# Patient Record
Sex: MALE | Race: BLACK OR AFRICAN AMERICAN | Employment: UNEMPLOYED | URBAN - METROPOLITAN AREA
[De-identification: names, ages, dates, MRNs, and addresses within clinical notes are randomized per-mention and may not be internally consistent; named-entity substitution may affect disease eponyms.]

---

## 2022-01-11 ENCOUNTER — HOSPITAL ENCOUNTER (EMERGENCY)
Age: 42
Discharge: HOME | End: 2022-01-11
Attending: EMERGENCY MEDICINE
Payer: MEDICAID

## 2022-01-11 VITALS
HEART RATE: 76 BPM | SYSTOLIC BLOOD PRESSURE: 154 MMHG | DIASTOLIC BLOOD PRESSURE: 92 MMHG | RESPIRATION RATE: 16 BRPM | TEMPERATURE: 98.2 F | OXYGEN SATURATION: 99 %

## 2022-01-11 DIAGNOSIS — Z20.2 STD EXPOSURE: Primary | ICD-10-CM

## 2022-01-11 LAB
BILIRUBIN URINE: ABNORMAL
BLOOD, URINE: NEGATIVE
CLARITY: CLEAR
COLOR: ABNORMAL
EPITHELIAL CELLS, UA: 1 /HPF (ref 0–5)
GLUCOSE URINE: NEGATIVE MG/DL
HYALINE CASTS: 7 /LPF (ref 0–8)
KETONES, URINE: ABNORMAL MG/DL
LEUKOCYTE ESTERASE, URINE: NEGATIVE
MICROSCOPIC EXAMINATION: YES
NITRITE, URINE: NEGATIVE
PH UA: 6 (ref 5–8)
PROTEIN UA: ABNORMAL MG/DL
RBC UA: 5 /HPF (ref 0–4)
SPECIFIC GRAVITY UA: >1.03 (ref 1–1.03)
SPECIMEN TYPE: NORMAL
TRICHOMONAS VAGINALIS SCREEN: NEGATIVE
URINE REFLEX TO CULTURE: ABNORMAL
URINE TYPE: ABNORMAL
UROBILINOGEN, URINE: 1 E.U./DL
WBC UA: 6 /HPF (ref 0–5)

## 2022-01-11 PROCEDURE — 81001 URINALYSIS AUTO W/SCOPE: CPT

## 2022-01-11 PROCEDURE — 99281 EMR DPT VST MAYX REQ PHY/QHP: CPT

## 2022-01-11 PROCEDURE — 87808 TRICHOMONAS ASSAY W/OPTIC: CPT

## 2022-01-11 ASSESSMENT — ENCOUNTER SYMPTOMS
DIARRHEA: 0
CONSTIPATION: 0
STRIDOR: 0
APNEA: 0
NAUSEA: 0
ABDOMINAL PAIN: 0
RECTAL PAIN: 0
WHEEZING: 0
EYE DISCHARGE: 0
CHEST TIGHTNESS: 0
EYE REDNESS: 0
PHOTOPHOBIA: 0
BACK PAIN: 0
ANAL BLEEDING: 0
ABDOMINAL DISTENTION: 0
EYE PAIN: 0
VOMITING: 0
EYE ITCHING: 0
SHORTNESS OF BREATH: 0
COUGH: 0
COLOR CHANGE: 0
BLOOD IN STOOL: 0
CHOKING: 0

## 2022-01-11 ASSESSMENT — PAIN SCALES - GENERAL: PAINLEVEL_OUTOF10: 0

## 2022-01-11 NOTE — ED PROVIDER NOTES
[facility]      Pt Name: Nimesh Hurtado  MRN: [id number]  [place] 1980  Date of evaluation: 1/11/2022  Provider: Prasad Ibrahim MD    CHIEF COMPLAINT       Chief Complaint   Patient presents with    Exposure to STD       HISTORY OF PRESENT ILLNESS    Nimesh Hurtado is a 39 y.o. male who presents to the emergency department with STD testing. Patient states he had sex with his baby mama and the condom broke. Concern he now has an STD. Asymptomatic. No other associated symptoms. Nursing Notes were reviewed. Including nursing noted for FM, Surgical History, Past Medical History, Social History, vitals, and allergies; agree with all. REVIEW OF SYSTEMS       Review of Systems   Constitutional: Negative for activity change, appetite change, chills, diaphoresis, fatigue, fever and unexpected weight change. HENT: Negative for congestion, dental problem, drooling, ear discharge and ear pain. Eyes: Negative for photophobia, pain, discharge, redness, itching and visual disturbance. Respiratory: Negative for apnea, cough, choking, chest tightness, shortness of breath, wheezing and stridor. Cardiovascular: Negative for chest pain, palpitations and leg swelling. Gastrointestinal: Negative for abdominal distention, abdominal pain, anal bleeding, blood in stool, constipation, diarrhea, nausea, rectal pain and vomiting. Endocrine: Negative for cold intolerance and heat intolerance. Genitourinary: Negative for decreased urine volume and urgency. Musculoskeletal: Negative for arthralgias and back pain. Skin: Negative for color change and pallor. Neurological: Negative for tremors and facial asymmetry. Hematological: Negative for adenopathy. Does not bruise/bleed easily. Psychiatric/Behavioral: Negative for agitation, behavioral problems, confusion and decreased concentration.        Except as noted above the remainder of the review of systems was reviewed and negative. PAST MEDICAL HISTORY   No past medical history on file. SURGICAL HISTORY     No past surgical history on file. CURRENT MEDICATIONS     There are no discharge medications for this patient. ALLERGIES     Patient has no known allergies. FAMILY HISTORY      No family history on file. SOCIAL HISTORY       Social History     Socioeconomic History    Marital status: Single     Spouse name: Not on file    Number of children: Not on file    Years of education: Not on file    Highest education level: Not on file   Occupational History    Not on file   Tobacco Use    Smoking status: Not on file    Smokeless tobacco: Not on file   Substance and Sexual Activity    Alcohol use: Not on file    Drug use: Not on file    Sexual activity: Not on file   Other Topics Concern    Not on file   Social History Narrative    Not on file     Social Determinants of Health     Financial Resource Strain:     Difficulty of Paying Living Expenses: Not on file   Food Insecurity:     Worried About Running Out of Food in the Last Year: Not on file    Josue of Food in the Last Year: Not on file   Transportation Needs:     Lack of Transportation (Medical): Not on file    Lack of Transportation (Non-Medical):  Not on file   Physical Activity:     Days of Exercise per Week: Not on file    Minutes of Exercise per Session: Not on file   Stress:     Feeling of Stress : Not on file   Social Connections:     Frequency of Communication with Friends and Family: Not on file    Frequency of Social Gatherings with Friends and Family: Not on file    Attends Buddhist Services: Not on file    Active Member of Clubs or Organizations: Not on file    Attends Club or Organization Meetings: Not on file    Marital Status: Not on file   Intimate Partner Violence:     Fear of Current or Ex-Partner: Not on file    Emotionally Abused: Not on file    Physically Abused: Not on file  Sexually Abused: Not on file   Housing Stability:     Unable to Pay for Housing in the Last Year: Not on file    Number of Places Lived in the Last Year: Not on file    Unstable Housing in the Last Year: Not on file       PHYSICAL EXAM       ED Triage Vitals [01/11/22 0314]   BP Temp Temp Source Pulse Resp SpO2 Height Weight   (!) 154/92 98.2 °F (36.8 °C) Oral 76 16 99 % -- --       Physical Exam  Vitals and nursing note reviewed. Constitutional:       General: He is not in acute distress. Appearance: He is well-developed. He is not diaphoretic. HENT:      Head: Normocephalic and atraumatic. Eyes:      General:         Right eye: No discharge. Left eye: No discharge. Pupils: Pupils are equal, round, and reactive to light. Neck:      Thyroid: No thyromegaly. Trachea: No tracheal deviation. Cardiovascular:      Rate and Rhythm: Normal rate and regular rhythm. Heart sounds: No murmur heard. Pulmonary:      Breath sounds: No wheezing or rales. Chest:      Chest wall: No tenderness. Abdominal:      General: There is no distension. Palpations: Abdomen is soft. There is no mass. Tenderness: There is no abdominal tenderness. There is no guarding or rebound. Musculoskeletal:         General: No tenderness or deformity. Normal range of motion. Cervical back: Normal range of motion. Skin:     General: Skin is warm. Coloration: Skin is not pale. Findings: No erythema or rash. Neurological:      Mental Status: He is alert. Cranial Nerves: No cranial nerve deficit. Motor: No abnormal muscle tone.       Coordination: Coordination normal.         DIAGNOSTIC RESULTS     EKG: All EKG's are interpreted by the Emergency Department Physician who either signs or Co-signs this chart in the absence of acardiologist.    None    RADIOLOGY:   Non-plain film images such as CT, Ultrasoundand MRI are read by the radiologist. Plain radiographic images are visualized and preliminarily interpreted by the emergency physician with the below findings:    None    ED BEDSIDE ULTRASOUND:   Performed by ED Physician - none    LABS:  Labs Reviewed   URINE RT REFLEX TO CULTURE - Abnormal; Notable for the following components:       Result Value    Bilirubin Urine SMALL (*)     Ketones, Urine TRACE (*)     Protein, UA TRACE (*)     All other components within normal limits    Narrative:     Performed at:  [address] [address]   Phone [phone]   MICROSCOPIC URINALYSIS - Abnormal; Notable for the following components:    WBC, UA 6 (*)     RBC, UA 5 (*)     All other components within normal limits    Narrative:     Performed at:  [address] [address]   Phone [phone]   C. TRACHOMATIS / N. GONORRHOEAE, DNA   TRICHOMONAS BY EIA    Narrative:     Performed at:  [address] [address]   Phone [phone]       All other labs were withinnormal range or not returned as of this dictation. EMERGENCY DEPARTMENT COURSE and DIFFERENTIAL DIAGNOSIS/MDM:     PMH, Surgical Hx, FH, Social Hx reviewed by myself (ETOH usage, Tobacco usage, Drug usage reviewed by myself, no pertinent Hx)- No Pertinent Hx     Old records were reviewed by me    Gonorrhea and chlamydia treatments not given per patient request.  He wants to wait for his urinalysis to culture out. He knows to get other STD testing done outpatient. I estimate there is LOW risk for Sepsis, MI, Stroke, Tamponade, PTX, Toxicity or other life threatening etiology thus I consider the discharge disposition reasonable. The patient is at low risk for mortality based on demographic, history and clinical factors.  Given the best available information and clinical assessment, I estimate the risk of hospitalization to be greater than risk of treatment at home. I have explained to the patient that the risk could rapidly change, given precautions for return and instructions. Explained to patient that the risk for mortality is low based on demographic, history and clinical factors. I discussed with patient the results of evaluation in the ED, diagnosis, care, and prognosis. The plan is to discharge to home. Patient is in agreement with plan and questions have been answered. I also discussed with patient the reasons which may require a return visit and the importance of follow-up care. The patient is well-appearing, nontoxic, and improved at the time of discharge. Patient agrees to call to arrange follow-up care as directed. Patient understands to return immediately for worsening/change in symptoms. CRITICAL CARE TIME   Total Critical Caretime was 21 minutes, excluding separately reportable procedures. There was a high probability of clinically significant/life threatening deterioration in the patient's condition which required my urgent intervention. PROCEDURES:  Unlessotherwise noted below, none    FINAL IMPRESSION      1. STD exposure          DISPOSITION/PLAN   DISPOSITION Decision To Discharge 01/11/2022 04:43:45 AM    PATIENT REFERRED TO:  PCP            DISCHARGE MEDICATIONS:  There are no discharge medications for this patient.          (Please note that portions ofthis note were completed with a voice recognition program.  Efforts were made to edit the dictations but occasionally words are mis-transcribed.)    Zackery Black MD(electronically signed)  Attending Emergency Physician          Zackery Black MD  01/11/22 1627

## 2022-06-17 NOTE — PROGRESS NOTES
Name_______________________________________Printed:____________________  Date and time of surgery__6/24/2022____0945__________________Arrival Time:____0815  ___masc_________   1. The instructions given regarding when and if a patient needs to stop oral intake prior to surgery varies. Follow the specific instructions you were given                  _x_Nothing to eat or to drink after Midnight the night before.                   ____Carbo loading or ERAS instructions will be given to select patients-if you have been given those instructions -please do the following                           The evening before your surgery after dinner before midnight drink 40 ounces of gatorade. If you are diabetic use sugar free. The morning of surgery drink 40 ounces of water. This needs to be finished 3 hours prior to your surgery start time. 2. Take the following pills with a small sip of water on the morning of surgery___________________________________________________                  Do not take blood pressure medications ending in pril or sartan the leslie prior to surgery or the morning of surgery_   3. Aspirin, Ibuprofen, Advil, Naproxen, Vitamin E and other Anti-inflammatory products and supplements should be stopped for 5 -7days before surgery or as directed by your physician. 4. Check with your Doctor regarding stopping Plavix, Coumadin,Eliquis, Lovenox,Effient,Pradaxa,Xarelto, Fragmin or other blood thinners and follow their instructions. 5. Do not smoke, and do not drink any alcoholic beverages 24 hours prior to surgery. This includes NA Beer. Refrain from the usage of any recreational drugs. 6. You may brush your teeth and gargle the morning of surgery. DO NOT SWALLOW WATER   7. You MUST make arrangements for a responsible adult to stay on site while you are here and take you home after your surgery. You will not be allowed to leave alone or drive yourself home.   It is strongly suggested someone stay with you the first 24 hrs. Your surgery will be cancelled if you do not have a ride home. 8. A parent/legal guardian must accompany a child scheduled for surgery and plan to stay at the hospital until the child is discharged. Please do not bring other children with you. 9. Please wear simple, loose fitting clothing to the hospital.  Fco Quintero not bring valuables (money, credit cards, checkbooks, etc.) Do not wear any makeup (including no eye makeup) or nail polish on your fingers or toes. 10. DO NOT wear any jewelry or piercings on day of surgery. All body piercing jewelry must be removed. 11. If you have ___dentures, they will be removed before going to the OR; we will provide you a container. If you wear ___contact lenses or ___glasses, they will be removed; please bring a case for them. 12. Please see your family doctor/pediatrician for a history & physical and/or concerning medications. Bring any test results/reports from your physician's office. PCP__________________Phone___________H&P Appt. Date________             13 If you  have a Living Will and Durable Power of  for Healthcare, please bring in a copy. 15. Notify your Surgeon if you develop any illness between now and surgery  time, cough, cold, fever, sore throat, nausea, vomiting, etc.  Please notify your surgeon if you experience dizziness, shortness of breath or blurred vision between now & the time of your surgery             15. DO NOT shave your operative site 96 hours prior to surgery. For face & neck surgery, men may use an electric razor 48 hours prior to surgery. 16. Shower the night before or morning of surgery using an antibacterial soap or as you have been instructed. 17. To provide excellent care visitors will be limited to one in the room at any given time. 18.  Please bring picture ID and insurance card.              19.  Visit our web site for additional information:  [email or web address]              20.During flu season no children under the age of 15 are permitted in the hospital for the safety of all patients. 21. If you take a long acting insulin in the evening only  take half of your usual  dose the night  before your procedure              22. If you use a c-pap please bring DOS if staying overnight,             23.For your convenience [facility] has a pharmacy on site to fill your prescriptions. 24. If you use oxygen and have a portable tank please bring it  with you the DOS             25. Bring a complete list of all your medications with name and dose include any supplements. 26. Other__________________________________________   *Please call pre admission testing if you any further questions   Branden Ayala   [address] [place] [address]. [place]  [phone]   [address] [place] [address]       VISITOR POLICY(subject to change)    Current policy is 2 visitors per patient. No children. A mask is required. Visiting hours are 8a-8p. Overnight visitors will be at the discretion of the nurse. All above information reviewed with patient in person or by phone. Patient verbalizes understanding. All questions and concerns addressed.                                                                                                  Patient/Rep__patient__________________                                                                                                                                    PRE OP INSTRUCTIONS

## 2022-06-24 ENCOUNTER — ANESTHESIA EVENT (OUTPATIENT)
Dept: OPERATING ROOM | Age: 42
End: 2022-06-24
Payer: MEDICAID

## 2022-06-24 ENCOUNTER — HOSPITAL ENCOUNTER (OUTPATIENT)
Age: 42
Setting detail: OUTPATIENT SURGERY
Discharge: HOME | End: 2022-06-24
Attending: PODIATRIST | Admitting: PODIATRIST
Payer: MEDICAID

## 2022-06-24 ENCOUNTER — ANESTHESIA (OUTPATIENT)
Dept: OPERATING ROOM | Age: 42
End: 2022-06-24
Payer: MEDICAID

## 2022-06-24 ENCOUNTER — APPOINTMENT (OUTPATIENT)
Dept: GENERAL RADIOLOGY | Age: 42
End: 2022-06-24
Attending: PODIATRIST
Payer: MEDICAID

## 2022-06-24 VITALS
RESPIRATION RATE: 14 BRPM | HEART RATE: 60 BPM | TEMPERATURE: 97.2 F | DIASTOLIC BLOOD PRESSURE: 90 MMHG | SYSTOLIC BLOOD PRESSURE: 137 MMHG | HEIGHT: 72 IN | WEIGHT: 225 LBS | BODY MASS INDEX: 30.48 KG/M2 | OXYGEN SATURATION: 99 %

## 2022-06-24 PROCEDURE — 2709999900 HC NON-CHARGEABLE SUPPLY: Performed by: PODIATRIST

## 2022-06-24 PROCEDURE — 7100000000 HC PACU RECOVERY - FIRST 15 MIN: Performed by: PODIATRIST

## 2022-06-24 PROCEDURE — 2500000003 HC RX 250 WO HCPCS: Performed by: NURSE ANESTHETIST, CERTIFIED REGISTERED

## 2022-06-24 PROCEDURE — 6360000002 HC RX W HCPCS: Performed by: NURSE ANESTHETIST, CERTIFIED REGISTERED

## 2022-06-24 PROCEDURE — 3600000014 HC SURGERY LEVEL 4 ADDTL 15MIN: Performed by: PODIATRIST

## 2022-06-24 PROCEDURE — 7100000010 HC PHASE II RECOVERY - FIRST 15 MIN: Performed by: PODIATRIST

## 2022-06-24 PROCEDURE — 3700000001 HC ADD 15 MINUTES (ANESTHESIA): Performed by: PODIATRIST

## 2022-06-24 PROCEDURE — 7100000001 HC PACU RECOVERY - ADDTL 15 MIN: Performed by: PODIATRIST

## 2022-06-24 PROCEDURE — 7100000011 HC PHASE II RECOVERY - ADDTL 15 MIN: Performed by: PODIATRIST

## 2022-06-24 PROCEDURE — 2720000010 HC SURG SUPPLY STERILE: Performed by: PODIATRIST

## 2022-06-24 PROCEDURE — 2580000003 HC RX 258: Performed by: PODIATRIST

## 2022-06-24 PROCEDURE — 3600000004 HC SURGERY LEVEL 4 BASE: Performed by: PODIATRIST

## 2022-06-24 PROCEDURE — 2580000003 HC RX 258: Performed by: NURSE ANESTHETIST, CERTIFIED REGISTERED

## 2022-06-24 PROCEDURE — 3700000000 HC ANESTHESIA ATTENDED CARE: Performed by: PODIATRIST

## 2022-06-24 PROCEDURE — 2500000003 HC RX 250 WO HCPCS: Performed by: PODIATRIST

## 2022-06-24 PROCEDURE — 73620 X-RAY EXAM OF FOOT: CPT

## 2022-06-24 PROCEDURE — 6360000002 HC RX W HCPCS: Performed by: PODIATRIST

## 2022-06-24 RX ORDER — PROPOFOL 10 MG/ML
INJECTION, EMULSION INTRAVENOUS PRN
Status: DISCONTINUED | OUTPATIENT
Start: 2022-06-24 | End: 2022-06-24 | Stop reason: SDUPTHER

## 2022-06-24 RX ORDER — MIDAZOLAM HYDROCHLORIDE 1 MG/ML
INJECTION INTRAMUSCULAR; INTRAVENOUS PRN
Status: DISCONTINUED | OUTPATIENT
Start: 2022-06-24 | End: 2022-06-24 | Stop reason: SDUPTHER

## 2022-06-24 RX ORDER — LIDOCAINE HYDROCHLORIDE 20 MG/ML
INJECTION, SOLUTION INFILTRATION; PERINEURAL
Status: COMPLETED | OUTPATIENT
Start: 2022-06-24 | End: 2022-06-24

## 2022-06-24 RX ORDER — ONDANSETRON 2 MG/ML
4 INJECTION INTRAMUSCULAR; INTRAVENOUS
Status: DISCONTINUED | OUTPATIENT
Start: 2022-06-24 | End: 2022-06-24 | Stop reason: HOSPADM

## 2022-06-24 RX ORDER — BUPIVACAINE HYDROCHLORIDE 5 MG/ML
INJECTION, SOLUTION EPIDURAL; INTRACAUDAL
Status: COMPLETED | OUTPATIENT
Start: 2022-06-24 | End: 2022-06-24

## 2022-06-24 RX ORDER — DEXMEDETOMIDINE HYDROCHLORIDE 100 UG/ML
INJECTION, SOLUTION INTRAVENOUS PRN
Status: DISCONTINUED | OUTPATIENT
Start: 2022-06-24 | End: 2022-06-24 | Stop reason: SDUPTHER

## 2022-06-24 RX ORDER — ONDANSETRON 2 MG/ML
INJECTION INTRAMUSCULAR; INTRAVENOUS PRN
Status: DISCONTINUED | OUTPATIENT
Start: 2022-06-24 | End: 2022-06-24 | Stop reason: SDUPTHER

## 2022-06-24 RX ORDER — SODIUM CHLORIDE 9 MG/ML
INJECTION, SOLUTION INTRAVENOUS CONTINUOUS PRN
Status: DISCONTINUED | OUTPATIENT
Start: 2022-06-24 | End: 2022-06-24 | Stop reason: SDUPTHER

## 2022-06-24 RX ORDER — KETOROLAC TROMETHAMINE 30 MG/ML
INJECTION, SOLUTION INTRAMUSCULAR; INTRAVENOUS PRN
Status: DISCONTINUED | OUTPATIENT
Start: 2022-06-24 | End: 2022-06-24 | Stop reason: SDUPTHER

## 2022-06-24 RX ORDER — SODIUM CHLORIDE 9 MG/ML
INJECTION, SOLUTION INTRAVENOUS PRN
Status: DISCONTINUED | OUTPATIENT
Start: 2022-06-24 | End: 2022-06-24 | Stop reason: HOSPADM

## 2022-06-24 RX ORDER — MEPERIDINE HYDROCHLORIDE 25 MG/ML
12.5 INJECTION INTRAMUSCULAR; INTRAVENOUS; SUBCUTANEOUS EVERY 5 MIN PRN
Status: DISCONTINUED | OUTPATIENT
Start: 2022-06-24 | End: 2022-06-24 | Stop reason: HOSPADM

## 2022-06-24 RX ORDER — DEXAMETHASONE SODIUM PHOSPHATE 4 MG/ML
INJECTION, SOLUTION INTRA-ARTICULAR; INTRALESIONAL; INTRAMUSCULAR; INTRAVENOUS; SOFT TISSUE PRN
Status: DISCONTINUED | OUTPATIENT
Start: 2022-06-24 | End: 2022-06-24 | Stop reason: SDUPTHER

## 2022-06-24 RX ORDER — HYDROMORPHONE HCL 110MG/55ML
0.25 PATIENT CONTROLLED ANALGESIA SYRINGE INTRAVENOUS EVERY 5 MIN PRN
Status: DISCONTINUED | OUTPATIENT
Start: 2022-06-24 | End: 2022-06-24 | Stop reason: HOSPADM

## 2022-06-24 RX ORDER — ACETAMINOPHEN 650 MG
TABLET, EXTENDED RELEASE ORAL
Status: COMPLETED | OUTPATIENT
Start: 2022-06-24 | End: 2022-06-24

## 2022-06-24 RX ORDER — SODIUM CHLORIDE 0.9 % (FLUSH) 0.9 %
5-40 SYRINGE (ML) INJECTION EVERY 12 HOURS SCHEDULED
Status: DISCONTINUED | OUTPATIENT
Start: 2022-06-24 | End: 2022-06-24 | Stop reason: HOSPADM

## 2022-06-24 RX ORDER — HYDROMORPHONE HCL 110MG/55ML
0.5 PATIENT CONTROLLED ANALGESIA SYRINGE INTRAVENOUS EVERY 5 MIN PRN
Status: DISCONTINUED | OUTPATIENT
Start: 2022-06-24 | End: 2022-06-24 | Stop reason: HOSPADM

## 2022-06-24 RX ORDER — DIPHENHYDRAMINE HYDROCHLORIDE 50 MG/ML
12.5 INJECTION INTRAMUSCULAR; INTRAVENOUS
Status: DISCONTINUED | OUTPATIENT
Start: 2022-06-24 | End: 2022-06-24 | Stop reason: HOSPADM

## 2022-06-24 RX ORDER — FENTANYL CITRATE 50 UG/ML
INJECTION, SOLUTION INTRAMUSCULAR; INTRAVENOUS PRN
Status: DISCONTINUED | OUTPATIENT
Start: 2022-06-24 | End: 2022-06-24 | Stop reason: SDUPTHER

## 2022-06-24 RX ORDER — LIDOCAINE HYDROCHLORIDE 20 MG/ML
INJECTION, SOLUTION EPIDURAL; INFILTRATION; INTRACAUDAL; PERINEURAL PRN
Status: DISCONTINUED | OUTPATIENT
Start: 2022-06-24 | End: 2022-06-24 | Stop reason: SDUPTHER

## 2022-06-24 RX ORDER — SODIUM CHLORIDE 0.9 % (FLUSH) 0.9 %
5-40 SYRINGE (ML) INJECTION PRN
Status: DISCONTINUED | OUTPATIENT
Start: 2022-06-24 | End: 2022-06-24 | Stop reason: HOSPADM

## 2022-06-24 RX ADMIN — MIDAZOLAM 2 MG: 1 INJECTION INTRAMUSCULAR; INTRAVENOUS at 09:44

## 2022-06-24 RX ADMIN — DEXAMETHASONE SODIUM PHOSPHATE 4 MG: 4 INJECTION, SOLUTION INTRAMUSCULAR; INTRAVENOUS at 09:55

## 2022-06-24 RX ADMIN — FENTANYL CITRATE 50 MCG: 50 INJECTION, SOLUTION INTRAMUSCULAR; INTRAVENOUS at 09:47

## 2022-06-24 RX ADMIN — PROPOFOL 250 MG: 10 INJECTION, EMULSION INTRAVENOUS at 09:49

## 2022-06-24 RX ADMIN — SODIUM CHLORIDE: 9 INJECTION, SOLUTION INTRAVENOUS at 09:46

## 2022-06-24 RX ADMIN — SODIUM CHLORIDE: 9 INJECTION, SOLUTION INTRAVENOUS at 10:39

## 2022-06-24 RX ADMIN — LIDOCAINE HYDROCHLORIDE 100 MG: 20 INJECTION, SOLUTION EPIDURAL; INFILTRATION; INTRACAUDAL; PERINEURAL at 09:49

## 2022-06-24 RX ADMIN — DEXMEDETOMIDINE HYDROCHLORIDE 100 MCG: 100 INJECTION, SOLUTION INTRAVENOUS at 11:08

## 2022-06-24 RX ADMIN — CEFAZOLIN 2000 MG: 2 INJECTION, POWDER, FOR SOLUTION INTRAMUSCULAR; INTRAVENOUS at 09:44

## 2022-06-24 RX ADMIN — ONDANSETRON 4 MG: 2 INJECTION INTRAMUSCULAR; INTRAVENOUS at 09:54

## 2022-06-24 RX ADMIN — KETOROLAC TROMETHAMINE 30 MG: 30 INJECTION, SOLUTION INTRAMUSCULAR; INTRAVENOUS at 10:24

## 2022-06-24 RX ADMIN — FENTANYL CITRATE 50 MCG: 50 INJECTION, SOLUTION INTRAMUSCULAR; INTRAVENOUS at 10:45

## 2022-06-24 ASSESSMENT — PAIN DESCRIPTION - PAIN TYPE: TYPE: CHRONIC PAIN

## 2022-06-24 ASSESSMENT — PAIN DESCRIPTION - FREQUENCY: FREQUENCY: CONTINUOUS

## 2022-06-24 ASSESSMENT — PAIN DESCRIPTION - LOCATION: LOCATION: FOOT

## 2022-06-24 ASSESSMENT — PAIN SCALES - GENERAL
PAINLEVEL_OUTOF10: 0
PAINLEVEL_OUTOF10: 2

## 2022-06-24 ASSESSMENT — PAIN DESCRIPTION - ONSET: ONSET: ON-GOING

## 2022-06-24 ASSESSMENT — PAIN DESCRIPTION - DESCRIPTORS: DESCRIPTORS: ACHING

## 2022-06-24 ASSESSMENT — PAIN DESCRIPTION - ORIENTATION: ORIENTATION: LEFT

## 2022-06-24 NOTE — ANESTHESIA POSTPROCEDURE EVALUATION
Department of Anesthesiology  Postprocedure Note    Patient: Yesenia Mcbride  MRN: [id number]  YOB: 1980  Date of evaluation: 6/24/2022      Procedure Summary     Date: 06/24/22 Room / Location: [address]    Anesthesia Start: 3515 Anesthesia Stop: 1112    Procedure: LEFT FOOT HAMMERTOE FOURTH AND FIFTH METATARSAL WITH EXCISION OF BONE SPURRING, LEFT FOOT SKIN PLASTY ADJACENT TISSUE FLAP CLOSURE (Left ) Diagnosis:       Osteophyte, unspecified joint      Laceration of left foot with foreign body, initial encounter      (Osteophyte, unspecified joint [M25.70])      (Laceration of left foot with foreign body, initial encounter [U39.989O])    Surgeons: Evangelina Sutton DPM Responsible Provider: Shira Melendez MD    Anesthesia Type: General ASA Status: 2          Anesthesia Type: General    Yves Phase I: Yves Score: 8    Yves Phase II:        Anesthesia Post Evaluation    Patient location during evaluation: PACU  Patient participation: complete - patient participated  Level of consciousness: awake and alert  Airway patency: patent  Nausea & Vomiting: no nausea and no vomiting  Complications: no  Cardiovascular status: blood pressure returned to baseline  Respiratory status: acceptable  Hydration status: euvolemic  Multimodal analgesia pain management approach

## 2022-06-24 NOTE — OP NOTE
[place] [address]                     [facility], [facility]                                OPERATIVE REPORT    PATIENT NAME: Conor Segovia                     :        1980  MED REC NO:   [id number]                          ROOM:  ACCOUNT NO:   [de-identified]                           ADMIT DATE: 2022  PROVIDER:     Rambo Marx DPM    DATE OF PROCEDURE:  2022    PREOPERATIVE DIAGNOSES:  1.  Grade III ulceration fourth interspace, left foot. 2.  Bone spur fourth and fifth metatarsals, left foot. 3.  Contracture flexor tendon fifth toe, left foot. 4.  Hammertoe fourth and fifth digits, left foot. POSTOPERATIVE DIAGNOSES:  1.  Grade III ulceration fourth interspace, left foot. 2.  Bone spur fourth and fifth metatarsals, left foot. 3.  Contracture flexor tendon fifth toe, left foot. 4.  Hammertoe fourth and fifth digits, left foot. OPERATION PERFORMED:  1. Excision of bone spur fourth and fifth toes and metatarsal regions,  left foot. 2.  Flexor tenotomy, left foot. 3.  Excision grade III ulceration with skin tissue advancement flap with  Schrudde flap with local soft tissue advancement. SURGEON:  Rambo Marx DPM    ANESTHESIA:  General.    COMPLICATIONS:  None noted. ESTIMATED BLOOD LOSS:  Less than 10 mL. PROCEDURE IN DETAIL:  Under mild sedation, the patient was brought into  the operating room and placed on the operating table in the supine  position. Pneumatic ankle cuff was then placed about the patient's left  ankle utilizing excessive cast padding 2 cm above the medial and lateral  malleoli. Following general anesthesia, local anesthesia was obtained  about the midfoot region at the base of the fourth and fifth metatarsals  with 10 mL of 2% lidocaine plain not to cause any vascular compromise it  was done in this particular region.   Foot and lower leg were then  scrubbed, prepped, and draped in the usual aseptic manner. An Esmarch  bandage was then utilized to exsanguinate the patient's left foot and  lower leg and the pneumatic ankle cuff was inflated to 250 mmHg, left. Attention was then directed to the dorsal aspect of the fourth toe,  where a 4 cm incision was made directly over the dorsal lateral aspect  of the proximal phalanx and the fourth metatarsal head region. Dissection was then continued all the way down through the subcutaneous  tissue with care being taken to identify and retract all vital neural  and vascular structures. All bleeders were cauterized and ligated as  necessary. Next, dissection was then continued down to the periosteal  and capsular structures where a 4 cm incision was made to the periosteal  and capsular structures thus exposing the large bone spur on the lateral  aspect of the base of the proximal phalanx and the head of the fourth  metatarsal.  Utilizing a 138 saw blade at this time, the saw was  directed from superior to inferior along the lateral aspect beginning on  the proximal phalanx and continuing down to the fourth metatarsal head  region. This wedge of bone 1.5 cm was taken from the lateral aspect of  the proximal phalanx and fourth metatarsal head. This bone was removed  in total.  This was all smoothed down with a power reciprocating rasp. The area was then flushed with copious amounts of sterile normal saline. Intraoperative C-arm was then brought into play to make sure there was  excellent resection and excision. There was perfect line and perfect  excision of this bone spur. Periosteal and capsular structures were  then sewn with 3-0 Vicryl. Subcutaneous tissues sewn with 4-0 Vicryl,  skin was then sewn with 3-0 nylon. Next, attention was then directed to  the distal aspect of the IP joint in the fifth toe where we made a stab  incision on the inferior aspect.   We transected the flexor tendon by  dorsiflexing the distal portion of the fifth toe and transecting it from  medial to lateral.  This was complete release and this brought the toe  out of the adducted varus position. This was flushed with copious  amounts of sterile normal saline. One 3-0 nylon stitch with simple  interrupted suture technique was then placed below this. Next, we made  an incision 3 cm over the dorsal central aspect of the fifth proximal  phalanx. The incision was made over this particular region. Once we  made the incision over this particular region, we opened the tissue  planes up from medial to lateral and there was a very large bone spur  present over the medial aspect of the middle, distal and proximal  phalanx. We took a 138 saw blade and we resected 1.5 cm of bone from  this particular region. This bone was removed in total from the distal,  medial and proximal phalanx in total.  We took a power reciprocating  rasp, contoured the edges down. Once the edges were then contoured  down, we took an intraoperative C-arm view to confirm there was  excellent resection of bone spur from this particular region. Please  note that in the subcutaneous tissues, there was a tremendous amount of  yellow fibrotic dead tissue and we reduced this and removed this all the  way down to healthy subperiosteal red granular tissue. The area was  then flushed with copious amounts of sterile normal saline. We took 3-0  Vicryl and we sewed the periosteal and capsular structures from medial  to lateral with simple interrupted suture technique, subcuticular tissue  sewn with 4-0 Vicryl with simple interrupted suture technique and the  skin was then sewn with 3-0 nylon utilizing simple interrupted suture  technique. Final part of the procedure was directed to the interspace  where there was a 3.5 x 2.4 grade III ulceration that was present with  dead yellow skin accompanying this.   We took a 15 blade and we  circumscribed all around this particular region which was on the medial  aspect of the fifth toe and the lateral aspect of the fourth toe. This  wedge of skin was completely resected and passed from the operative  field in total.  There was extensive undermining done over the superior  and inferior portions. There was a second incision made directly over  the dorsal aspect of the interspace that was Schrudde flap. This flap  was then done in conjunction with this and we were able to rotate the  flap down to the interspace and then we were able to stretch the flap  from medial to lateral to cover all of the open areas that were present  here. The subcutaneous tissues were then sewn with 3-0 Vicryl at this  time. Skin was then sewn with 3-0 nylon and excellent skin apposition  side to side with no tension or compression was noted and there was full  coverage of this particular region with brand new skin. The area was  then flushed with copious amounts of sterile normal saline. Final  intraoperative C-arm view confirmed excellent resection of bone spurs  from the lateral aspect of the fourth toe and fourth metatarsal and the  medial aspect of the fifth toe. Excellent position of the toes with gap  separation, no further rubbing so this should hopefully eliminate the  ulceration problem that the patient has been having for multiple years. At this time, we utilized a total of 5 mL of 0.5% Marcaine plain. This  was dressed with Betadine-soaked Adaptic, sterile 4 x 4's, 4 x 8's, two  rolls of 2-inch Diallo, several layers of fluffy cast padding, and Coban  were then applied. Tourniquet had been deflated and a prompt hyperemic  response was noted in all digits one through five of the left foot. The patient tolerated the procedure and anesthesia well and transferred  to the recovery room with vital signs stable and vascular status intact  to all digits one through five of the left foot.   Following a period of  postop monitoring, the patient will be discharged home on oral and  written postop instructions per

## 2022-06-24 NOTE — PROGRESS NOTES
Dr Eugene Mayre    Pre op: Hammertoe 4th and 5th digits left foot, Bone spur 4th and 5th digits left foot, Ulcer grade 3 left foot, Pain, Tendon contracture left foot    Post op: same    Procedure: Excision bone spur 4th and 5th metatarsals left foot, Flexor tenotomy left foot, Adjacent tissue flap with soft tissue rearrangement left foot withy exciswion ulceration left foot    Anesthesia: General    Complications: None noted    Ebl: less than 10 cc

## 2022-06-24 NOTE — ANESTHESIA PRE PROCEDURE
Department of Anesthesiology  Preprocedure Note       Name:  Cornell Stephens   Age:  39 y.o.  :  1980                                          MRN:  [id number]         Date:  2022      Surgeon: Taniya Bailon):  Kristi Carpio DPM    Procedure: Procedure(s):  LEFT FOOT HAMMERTOE FOURTH AND FIFTH METATARSAL WITH EXCISION OF BONE SPURRING, LEFT FOOT SKIN PLASTY ADJACENT TISSUE FLAP CLOSURE    Medications prior to admission:   Prior to Admission medications    Not on File       Current medications:    No current facility-administered medications for this encounter. No current outpatient medications on file. Allergies:  No Known Allergies    Problem List:  There is no problem list on file for this patient. Past Medical History:        Diagnosis Date    Hypertension        Past Surgical History:        Procedure Laterality Date    CHOLECYSTECTOMY         Social History:    Social History     Tobacco Use    Smoking status: Current Every Day Smoker     Years: 20.00     Types: Cigars    Smokeless tobacco: Never Used   Substance Use Topics    Alcohol use: Never                                Ready to quit: Not Answered  Counseling given: Not Answered      Vital Signs (Current):   Vitals:    22 1445   Weight: 249 lb (112.9 kg)   Height: 6' (1.829 m)                                              BP Readings from Last 3 Encounters:   22 (!) 154/92       NPO Status:                                                                                 BMI:   Wt Readings from Last 3 Encounters:   22 249 lb (112.9 kg)     Body mass index is 33.77 kg/m². CBC: No results found for: WBC, RBC, HGB, HCT, MCV, RDW, PLT    CMP: No results found for: NA, K, CL, CO2, BUN, CREATININE, GFRAA, AGRATIO, LABGLOM, GLUCOSE, GLU, PROT, CALCIUM, BILITOT, ALKPHOS, AST, ALT    POC Tests: No results for input(s): POCGLU, POCNA, POCK, POCCL, POCBUN, POCHEMO, POCHCT in the last 72 hours.     Coags: No results found for: PROTIME, INR, APTT    HCG (If Applicable): No results found for: PREGTESTUR, PREGSERUM, HCG, HCGQUANT     ABGs: No results found for: PHART, PO2ART, XKB5FPY, LZW3EPS, BEART, S9BVOVGG     Type & Screen (If Applicable):  No results found for: LABABO, LABRH    Drug/Infectious Status (If Applicable):  No results found for: HIV, HEPCAB    COVID-19 Screening (If Applicable): No results found for: COVID19        Anesthesia Evaluation  Patient summary reviewed and Nursing notes reviewed  Airway: Mallampati: II  TM distance: >3 FB   Neck ROM: full  Mouth opening: > = 3 FB   Dental:          Pulmonary:                              Cardiovascular:  Exercise tolerance: good (>4 METS),   (+) hypertension: moderate,                   Neuro/Psych:               GI/Hepatic/Renal:             Endo/Other:                     Abdominal:             Vascular: Other Findings:           Anesthesia Plan      general     ASA 2       Induction: intravenous. MIPS: Postoperative opioids intended, Prophylactic antiemetics administered and Postoperative trial extubation. Anesthetic plan and risks discussed with patient. Plan discussed with CRNA.           Post-op pain plan if not by surgeon: single peripheral nerve block and regional            Roxy Schrader MD   6/24/2022

## 2022-06-24 NOTE — PROGRESS NOTES
Discharged home via W/C to care of friend. Ventura Santiago- non weight bearing left foot. Dressing remains dry.  Denies pain/ nausea

## 2022-06-24 NOTE — PROGRESS NOTES
Discharge instructions review with patient and Luisa Monreal. All home medications have been reviewed, pt v/u. Discharge instructions signed. Pt discharged via wheelchair. Pt discharged with crutches and all belongings. Marcial Felipe taking stable pt home.

## 2022-06-24 NOTE — H&P
Pt her today for [facility] surgery with Dr Sindhu Camejo. He was seen by PCP on 6/20/22. This document was reviewed. No updates . Heart tones normal.  Lungs are clear.   Pt encouraged to stop smoking

## 2022-06-24 NOTE — PROGRESS NOTES
Pt arrived from OR to PACU bay. Reported received from [address] staff. Pt combative and trying to climb out of bed. Surgical incisions dressings in place to L foot. Pt on 6L simple mask with Nasal trumpet in place, NSR, and VSS. Will continue to monitor.

## 2023-01-25 NOTE — PROGRESS NOTES
Name_______________________________________Printed:____________________  Date and time of surgery__2/1/2023___0730___________________Arrival Time:__0600  masc______________   1. The instructions given regarding when and if a patient needs to stop oral intake prior to surgery varies. Follow the specific instructions you were given                  _x__Nothing to eat or to drink after Midnight the night before.                   ____Carbo loading or ERAS instructions will be given to select patients-if you have been given those instructions -please do the following                           The evening before your surgery after dinner before midnight drink 40 ounces of gatorade. If you are diabetic use sugar free. The morning of surgery drink 40 ounces of water. This needs to be finished 3 hours prior to your surgery start time. 2. Take the following pills with a small sip of water on the morning of surgery_none__________________________________________________                  Do not take blood pressure medications ending in pril or sartan the leslie prior to surgery or the morning of surgery. Dr Cristel Headley patient are not to take any medications the AM of surgery. 3. Aspirin, Ibuprofen, Advil, Naproxen, Vitamin E and other Anti-inflammatory products and supplements should be stopped for 5 -7days before surgery or as directed by your physician. 4. Check with your Doctor regarding stopping Plavix, Coumadin,Eliquis, Lovenox,Effient,Pradaxa,Xarelto, Fragmin or other blood thinners and follow their instructions. 5. Do not smoke, and do not drink any alcoholic beverages 24 hours prior to surgery. This includes NA Beer. Refrain from the usage of any recreational drugs. 6. You may brush your teeth and gargle the morning of surgery. DO NOT SWALLOW WATER   7. You MUST make arrangements for a responsible adult to stay on site while you are here and take you home after your surgery.  You will not be allowed to leave alone or drive yourself home. It is strongly suggested someone stay with you the first 24 hrs. Your surgery will be cancelled if you do not have a ride home. 8. A parent/legal guardian must accompany a child scheduled for surgery and plan to stay at the hospital until the child is discharged. Please do not bring other children with you. 9. Please wear simple, loose fitting clothing to the hospital.  [place] not bring valuables (money, credit cards, checkbooks, etc.) Do not wear any makeup (including no eye makeup) or nail polish on your fingers or toes. 10. DO NOT wear any jewelry or piercings on day of surgery. All body piercing jewelry must be removed. 11. If you have ___dentures, they will be removed before going to the OR; we will provide you a container. If you wear ___contact lenses or ___glasses, they will be removed; please bring a case for them. 12. Please see your family doctor/pediatrician for a history & physical and/or concerning medications. Bring any test results/reports from your physician's office. PCP__________________Phone___________H&P Appt. Date________             13 If you  have a Living Will and Durable Power of  for Healthcare, please bring in a copy. 15. Notify your Surgeon if you develop any illness between now and surgery  time, cough, cold, fever, sore throat, nausea, vomiting, etc.  Please notify your surgeon if you experience dizziness, shortness of breath or blurred vision between now & the time of your surgery             15. DO NOT shave your operative site 96 hours prior to surgery. For face & neck surgery, men may use an electric razor 48 hours prior to surgery. 16. Shower the night before or morning of surgery using an antibacterial soap or as you have been instructed. 17. To provide excellent care visitors will be limited to one in the room at any given time.              18.  Please bring picture ID and insurance card. 19.  Visit our web site for additional information:  [email or web address]              20.During flu season no children under the age of 15 are permitted in the hospital for the safety of all patients. 21. If you take a long acting insulin in the evening only  take half of your usual  dose the night  before your procedure              22. If you use a c-pap please bring DOS if staying overnight,             23.For your convenience [facility] has a pharmacy on site to fill your prescriptions. 24. If you use oxygen and have a portable tank please bring it  with you the DOS             25. Bring a complete list of all your medications with name and dose include any supplements. 26. Other__________________________________________   *Please call pre admission testing if you any further questions   [place] [id number]. Air  [phone]   [address] [place] [address]       VISITOR POLICY(subject to change)    Current policy is 2 visitors per patient. No children. Mask is  at the discretion of the facility. Visiting hours are 8a-8p. Overnight visitors will be at the discretion of the nurse. All policies subject to change. All above information reviewed with patient in person or by phone. Patient verbalizes understanding. All questions and concerns addressed.                                                                                                  Patient/Rep_patient___________________                                                                                                                                    PRE OP INSTRUCTIONS

## 2023-01-31 NOTE — H&P
[facility]   HISTORY AND PHYSICAL      I am seeing Naheed Cowan at the request of Dr Annabella Collazo for evaluation of the patient's medical problems prior to right foot excision ulceration with local advancement tissue flap for closure - right     1/31/2023 3:40 PM    Patient Information:  Naheed Cowan is a 43 y.o. male [id number]  PCP:  No primary care provider on file. (Tel: None )    Chief complaint:  I am having surgery on my foot       History of Present Illness:  Chaz Carreon is a 43 y.o. male who presented with an ulceration between his 4th and 5 th toe. Symptom onset was gradual for a time period of several months. The severity is described as moderate. The course of his symptoms over time is recurrent. The symptoms improved with non weight bearing and worsened with weight bearing. The patient's symptom is associated with an ulceration. History obtained from patient and record review   Pt is physically active     REVIEW OF SYSTEMS:   Constitutional:  Negative for fever,chills or night sweats  ENT:  Negative for rhinorrhea, epistaxis, hoarseness, sore throat. Respiratory:   Negative for shortness of breath,wheezing  Cardiovascular:   Negative for  chest pain, palpitations   Gastrointestinal:  Negative for nausea, vomiting, diarrhea  Genitourinary:  Negative for polyuria, dysuria   Hematologic/Lymphatic:  Negative for  bleeding tendency, easy bruising  Musculoskeletal:  Negative for myalgias and arthralgias  Neurologic:  Negative for  confusion,dysarthria. Skin:  Negative for itching,rash  Psychiatric:  Negative for depression,anxiety, agitation. Endocrine:  Negative for polydipsia,polyuria,heat /cold intolerance. Past Medical History:   has a past medical history of Hypertension and Prolonged emergence from general anesthesia.      Past Surgical History:   has a past surgical history that includes Cholecystectomy and Foot surgery (Left, 2022). Medications:  No current facility-administered medications on file prior to encounter. Current Outpatient Medications on File Prior to Encounter   Medication Sig Dispense Refill    amLODIPine (NORVASC) 5 MG tablet Take 5 mg by mouth daily      hydroCHLOROthiazide (MICROZIDE) 12.5 MG capsule Take 12.5 mg by mouth daily         Allergies:   NKA    Social History:   reports that he has been smoking cigars. He has never used smokeless tobacco. He reports current drug use. Drug: Marijuana Grover Hotter). He reports that he does not drink alcohol. Family History:  Patient raised by his grandmother who he thinks  from stroke  Hx of parents and sibs is unknown     Physical Exam:  BP (!) 143/94   Pulse 67   Temp 97.8 °F (36.6 °C) (Temporal)   Resp 20   Ht 6' (1.829 m)   Wt 220 lb (99.8 kg)   SpO2 100%   BMI 29.84 kg/m²     General appearance:  Appears comfortable. Well nourished  Eyes: Sclera clear, pupils equal  ENT: Moist mucus membranes, no thrush. Trachea midline. Cardiovascular: Regular rhythm, normal S1, S2. No murmur, gallop, rub. No edema in lower extremities  Respiratory: Clear to auscultation bilaterally, no wheeze, good inspiratory effort  Gastrointestinal: Abdomen soft, non-tender, not distended, normal bowel sounds  Musculoskeletal: No cyanosis in digits, neck supple  Neurology: Cranial nerves grossly intact. Alert and oriented in time, place and person. No speech or motor deficits  Psychiatry: Appropriate affect.  Not agitated  Skin: Warm, dry, normal turgor, no rash    Labs:  CBC: No results found for: WBC, RBC, HGB, HCT, MCV, MCH, MCHC, RDW, PLT, MPV  BMP:  No results found for: NA, K, CL, CO2, BUN, CREATININE, CALCIUM, GFRAA, LABGLOM, GLUCOSE    Chest Xray:   EKG:        Problem List  Foot ulceration  HTN  Tobacco use        Assessment & Recommendation:     Foot ulceration: for surgery today   HTN: On CCB and HCTZ  Tobacco use:  cessation encouraged Patient denies any prolonged emergence from anesthesia: He denies any previous issues with anesthesia      Patient is  medically optimized for surgery. Thank you for the opportunity to participate in the care of your patient.   Irl Ganser, APRN - CNP    2/1/2023 7:12 AM

## 2023-02-01 ENCOUNTER — APPOINTMENT (OUTPATIENT)
Dept: GENERAL RADIOLOGY | Age: 43
End: 2023-02-01
Attending: PODIATRIST
Payer: MEDICAID

## 2023-02-01 ENCOUNTER — ANESTHESIA (OUTPATIENT)
Dept: OPERATING ROOM | Age: 43
End: 2023-02-01
Payer: MEDICAID

## 2023-02-01 ENCOUNTER — ANESTHESIA EVENT (OUTPATIENT)
Dept: OPERATING ROOM | Age: 43
End: 2023-02-01
Payer: MEDICAID

## 2023-02-01 ENCOUNTER — HOSPITAL ENCOUNTER (OUTPATIENT)
Age: 43
Setting detail: OUTPATIENT SURGERY
Discharge: HOME | End: 2023-02-01
Attending: PODIATRIST | Admitting: PODIATRIST
Payer: MEDICAID

## 2023-02-01 VITALS
WEIGHT: 220 LBS | TEMPERATURE: 97.4 F | HEIGHT: 72 IN | DIASTOLIC BLOOD PRESSURE: 92 MMHG | OXYGEN SATURATION: 99 % | BODY MASS INDEX: 29.8 KG/M2 | SYSTOLIC BLOOD PRESSURE: 139 MMHG | RESPIRATION RATE: 16 BRPM | HEART RATE: 62 BPM

## 2023-02-01 DIAGNOSIS — M67.01 SHORT ACHILLES TENDON (ACQUIRED), RIGHT ANKLE: ICD-10-CM

## 2023-02-01 DIAGNOSIS — S91.321A LACERATION WITH FOREIGN BODY, RIGHT FOOT, INITIAL ENCOUNTER: ICD-10-CM

## 2023-02-01 DIAGNOSIS — M25.70 OSTEOPHYTE, UNSPECIFIED JOINT: ICD-10-CM

## 2023-02-01 PROCEDURE — 6360000002 HC RX W HCPCS: Performed by: PODIATRIST

## 2023-02-01 PROCEDURE — 2500000003 HC RX 250 WO HCPCS: Performed by: NURSE ANESTHETIST, CERTIFIED REGISTERED

## 2023-02-01 PROCEDURE — 73620 X-RAY EXAM OF FOOT: CPT

## 2023-02-01 PROCEDURE — 6360000002 HC RX W HCPCS: Performed by: NURSE ANESTHETIST, CERTIFIED REGISTERED

## 2023-02-01 PROCEDURE — 88305 TISSUE EXAM BY PATHOLOGIST: CPT

## 2023-02-01 PROCEDURE — 3700000000 HC ANESTHESIA ATTENDED CARE: Performed by: PODIATRIST

## 2023-02-01 PROCEDURE — 7100000001 HC PACU RECOVERY - ADDTL 15 MIN: Performed by: PODIATRIST

## 2023-02-01 PROCEDURE — 2709999900 HC NON-CHARGEABLE SUPPLY: Performed by: PODIATRIST

## 2023-02-01 PROCEDURE — 2500000003 HC RX 250 WO HCPCS: Performed by: PODIATRIST

## 2023-02-01 PROCEDURE — 2580000003 HC RX 258: Performed by: NURSE ANESTHETIST, CERTIFIED REGISTERED

## 2023-02-01 PROCEDURE — 2580000003 HC RX 258: Performed by: PODIATRIST

## 2023-02-01 PROCEDURE — 7100000011 HC PHASE II RECOVERY - ADDTL 15 MIN: Performed by: PODIATRIST

## 2023-02-01 PROCEDURE — 3600000014 HC SURGERY LEVEL 4 ADDTL 15MIN: Performed by: PODIATRIST

## 2023-02-01 PROCEDURE — 3700000001 HC ADD 15 MINUTES (ANESTHESIA): Performed by: PODIATRIST

## 2023-02-01 PROCEDURE — 2720000010 HC SURG SUPPLY STERILE: Performed by: PODIATRIST

## 2023-02-01 PROCEDURE — 3600000004 HC SURGERY LEVEL 4 BASE: Performed by: PODIATRIST

## 2023-02-01 PROCEDURE — 7100000010 HC PHASE II RECOVERY - FIRST 15 MIN: Performed by: PODIATRIST

## 2023-02-01 PROCEDURE — 7100000000 HC PACU RECOVERY - FIRST 15 MIN: Performed by: PODIATRIST

## 2023-02-01 RX ORDER — SODIUM CHLORIDE, SODIUM LACTATE, POTASSIUM CHLORIDE, CALCIUM CHLORIDE 600; 310; 30; 20 MG/100ML; MG/100ML; MG/100ML; MG/100ML
INJECTION, SOLUTION INTRAVENOUS CONTINUOUS PRN
Status: DISCONTINUED | OUTPATIENT
Start: 2023-02-01 | End: 2023-02-01 | Stop reason: SDUPTHER

## 2023-02-01 RX ORDER — HYDROMORPHONE HCL 110MG/55ML
0.25 PATIENT CONTROLLED ANALGESIA SYRINGE INTRAVENOUS EVERY 5 MIN PRN
Status: DISCONTINUED | OUTPATIENT
Start: 2023-02-01 | End: 2023-02-01 | Stop reason: HOSPADM

## 2023-02-01 RX ORDER — SODIUM CHLORIDE 0.9 % (FLUSH) 0.9 %
5-40 SYRINGE (ML) INJECTION EVERY 12 HOURS SCHEDULED
Status: DISCONTINUED | OUTPATIENT
Start: 2023-02-01 | End: 2023-02-01 | Stop reason: HOSPADM

## 2023-02-01 RX ORDER — BUPIVACAINE HYDROCHLORIDE 5 MG/ML
INJECTION, SOLUTION EPIDURAL; INTRACAUDAL
Status: COMPLETED | OUTPATIENT
Start: 2023-02-01 | End: 2023-02-01

## 2023-02-01 RX ORDER — HYDROCHLOROTHIAZIDE 12.5 MG/1
12.5 CAPSULE, GELATIN COATED ORAL DAILY
COMMUNITY

## 2023-02-01 RX ORDER — DEXAMETHASONE SODIUM PHOSPHATE 4 MG/ML
INJECTION, SOLUTION INTRA-ARTICULAR; INTRALESIONAL; INTRAMUSCULAR; INTRAVENOUS; SOFT TISSUE PRN
Status: DISCONTINUED | OUTPATIENT
Start: 2023-02-01 | End: 2023-02-01 | Stop reason: SDUPTHER

## 2023-02-01 RX ORDER — GLYCOPYRROLATE 0.2 MG/ML
INJECTION INTRAMUSCULAR; INTRAVENOUS PRN
Status: DISCONTINUED | OUTPATIENT
Start: 2023-02-01 | End: 2023-02-01 | Stop reason: SDUPTHER

## 2023-02-01 RX ORDER — SODIUM CHLORIDE, SODIUM LACTATE, POTASSIUM CHLORIDE, CALCIUM CHLORIDE 600; 310; 30; 20 MG/100ML; MG/100ML; MG/100ML; MG/100ML
INJECTION, SOLUTION INTRAVENOUS CONTINUOUS
Status: DISCONTINUED | OUTPATIENT
Start: 2023-02-01 | End: 2023-02-01 | Stop reason: HOSPADM

## 2023-02-01 RX ORDER — SODIUM CHLORIDE 9 MG/ML
INJECTION, SOLUTION INTRAVENOUS PRN
Status: DISCONTINUED | OUTPATIENT
Start: 2023-02-01 | End: 2023-02-01 | Stop reason: HOSPADM

## 2023-02-01 RX ORDER — ACETAMINOPHEN 650 MG
TABLET, EXTENDED RELEASE ORAL
Status: COMPLETED | OUTPATIENT
Start: 2023-02-01 | End: 2023-02-01

## 2023-02-01 RX ORDER — ONDANSETRON 2 MG/ML
INJECTION INTRAMUSCULAR; INTRAVENOUS PRN
Status: DISCONTINUED | OUTPATIENT
Start: 2023-02-01 | End: 2023-02-01 | Stop reason: SDUPTHER

## 2023-02-01 RX ORDER — AMLODIPINE BESYLATE 5 MG/1
5 TABLET ORAL DAILY
COMMUNITY
Start: 2021-02-01

## 2023-02-01 RX ORDER — PROPOFOL 10 MG/ML
INJECTION, EMULSION INTRAVENOUS PRN
Status: DISCONTINUED | OUTPATIENT
Start: 2023-02-01 | End: 2023-02-01 | Stop reason: SDUPTHER

## 2023-02-01 RX ORDER — ONDANSETRON 2 MG/ML
4 INJECTION INTRAMUSCULAR; INTRAVENOUS
Status: DISCONTINUED | OUTPATIENT
Start: 2023-02-01 | End: 2023-02-01 | Stop reason: HOSPADM

## 2023-02-01 RX ORDER — LIDOCAINE HYDROCHLORIDE 20 MG/ML
INJECTION, SOLUTION INFILTRATION; PERINEURAL
Status: COMPLETED | OUTPATIENT
Start: 2023-02-01 | End: 2023-02-01

## 2023-02-01 RX ORDER — FENTANYL CITRATE 50 UG/ML
INJECTION, SOLUTION INTRAMUSCULAR; INTRAVENOUS PRN
Status: DISCONTINUED | OUTPATIENT
Start: 2023-02-01 | End: 2023-02-01 | Stop reason: SDUPTHER

## 2023-02-01 RX ORDER — SODIUM CHLORIDE 0.9 % (FLUSH) 0.9 %
5-40 SYRINGE (ML) INJECTION PRN
Status: DISCONTINUED | OUTPATIENT
Start: 2023-02-01 | End: 2023-02-01 | Stop reason: HOSPADM

## 2023-02-01 RX ORDER — LIDOCAINE HYDROCHLORIDE 20 MG/ML
INJECTION, SOLUTION INFILTRATION; PERINEURAL PRN
Status: DISCONTINUED | OUTPATIENT
Start: 2023-02-01 | End: 2023-02-01 | Stop reason: SDUPTHER

## 2023-02-01 RX ORDER — KETAMINE HCL IN NACL, ISO-OSM 100MG/10ML
SYRINGE (ML) INJECTION PRN
Status: DISCONTINUED | OUTPATIENT
Start: 2023-02-01 | End: 2023-02-01 | Stop reason: SDUPTHER

## 2023-02-01 RX ORDER — HYDROMORPHONE HCL 110MG/55ML
0.5 PATIENT CONTROLLED ANALGESIA SYRINGE INTRAVENOUS EVERY 5 MIN PRN
Status: DISCONTINUED | OUTPATIENT
Start: 2023-02-01 | End: 2023-02-01 | Stop reason: HOSPADM

## 2023-02-01 RX ORDER — MIDAZOLAM HYDROCHLORIDE 1 MG/ML
INJECTION INTRAMUSCULAR; INTRAVENOUS PRN
Status: DISCONTINUED | OUTPATIENT
Start: 2023-02-01 | End: 2023-02-01 | Stop reason: SDUPTHER

## 2023-02-01 RX ADMIN — PROPOFOL 250 MG: 10 INJECTION, EMULSION INTRAVENOUS at 07:36

## 2023-02-01 RX ADMIN — SODIUM CHLORIDE, POTASSIUM CHLORIDE, SODIUM LACTATE AND CALCIUM CHLORIDE: 600; 310; 30; 20 INJECTION, SOLUTION INTRAVENOUS at 06:31

## 2023-02-01 RX ADMIN — LIDOCAINE HYDROCHLORIDE 100 MG: 20 INJECTION, SOLUTION INFILTRATION; PERINEURAL at 07:36

## 2023-02-01 RX ADMIN — SODIUM CHLORIDE, POTASSIUM CHLORIDE, SODIUM LACTATE AND CALCIUM CHLORIDE: 600; 310; 30; 20 INJECTION, SOLUTION INTRAVENOUS at 07:29

## 2023-02-01 RX ADMIN — Medication 20 MG: at 08:40

## 2023-02-01 RX ADMIN — Medication 30 MG: at 07:46

## 2023-02-01 RX ADMIN — FENTANYL CITRATE 100 MCG: 50 INJECTION, SOLUTION INTRAMUSCULAR; INTRAVENOUS at 07:36

## 2023-02-01 RX ADMIN — CEFAZOLIN 2000 MG: 2 INJECTION, POWDER, FOR SOLUTION INTRAMUSCULAR; INTRAVENOUS at 07:29

## 2023-02-01 RX ADMIN — GLYCOPYRROLATE 0.2 MG: 0.2 INJECTION INTRAMUSCULAR; INTRAVENOUS at 08:11

## 2023-02-01 RX ADMIN — ONDANSETRON 4 MG: 2 INJECTION INTRAMUSCULAR; INTRAVENOUS at 07:55

## 2023-02-01 RX ADMIN — MIDAZOLAM 2 MG: 1 INJECTION INTRAMUSCULAR; INTRAVENOUS at 07:29

## 2023-02-01 RX ADMIN — DEXAMETHASONE SODIUM PHOSPHATE 8 MG: 4 INJECTION, SOLUTION INTRAMUSCULAR; INTRAVENOUS at 07:55

## 2023-02-01 RX ADMIN — SODIUM CHLORIDE, POTASSIUM CHLORIDE, SODIUM LACTATE AND CALCIUM CHLORIDE: 600; 310; 30; 20 INJECTION, SOLUTION INTRAVENOUS at 08:14

## 2023-02-01 ASSESSMENT — PAIN SCALES - GENERAL
PAINLEVEL_OUTOF10: 0
PAINLEVEL_OUTOF10: 0

## 2023-02-01 ASSESSMENT — PAIN - FUNCTIONAL ASSESSMENT: PAIN_FUNCTIONAL_ASSESSMENT: 0-10

## 2023-02-01 NOTE — DISCHARGE INSTRUCTIONS
Pt to keep dressing dry, clean intact of the surgical extremity. Pt to elevate above heart level, place ice about the surgical extremity for 20 minutes per hour while awake. Pt to remain strict non-weightbearing with crutch use, or knee scooter use. Pt to begin knee bends, leg lifts 20 repetitions per hour. Pt to take temp 3 times per day, if above 101, call office immediately. Dispense and instruct on incentive spirometer for 5 days use post operatively. Pt to begin post op meds as previously dispensed in office. Discharge to home  when stable. Observe operative area for signs of excessive bleeding such as a slow general ooze that saturates the dressing or bright red bleeding. In either case, apply pressure to the area and elevate if possible and call your surgeon right away. Observe operative site for any signs of infection such as increased pain, redness, fever greater than 101 degrees, swelling, foul odor or drainage. Contact surgeon if any of these symptoms are present. If you develop chest pain or shortness of breath call  go to the nearest emergency room. Avoid stress to suture line such as pulling, pushing or tugging. Take medications as ordered. Take pain medication with food. Do not drive or operate machinery while taking narcotics. Call your surgeon for any questions or problems. DR. Megan Mayes [phone]     ANESTHESIA DISCHARGE INSTRUCTIONS    Wear your seatbelt home. You are under the influence of drugs-do not drink alcohol, drive, operate machinery, make any important decisions or sign any legal documents for 24 hours. A responsible adult needs to be with you for 24 hours. You may experience lightheadedness, dizziness, or sleepiness following surgery. Rest at home today- increase activity as tolerated. Progress slowly to a regular diet unless your physician has instructed you otherwise. Drink plenty of water.   If persistent nausea and vomiting becomes a problem, call your physician. Coughing, sore throat and muscle aches are other side effects of anesthesia, and should improve with time. Do not drive or operate machinery while taking narcotics.

## 2023-02-01 NOTE — PROGRESS NOTES
Pt awake and alert at this time. Pt on RA, and VSS. Pt denies pain and nausea, tolerating PO. Skin warm , palpable pulses and able to wiggle right toes. Pt meets criteria to be discharged from Phase 1.

## 2023-02-01 NOTE — PROGRESS NOTES
Discharge instructions review with patient and girlfriend. All home medications have been reviewed, pt v/u. Discharge instructions signed. Pt discharged via wheelchair. Pt discharged with IS, discharge paperwork, crutches, and all belongings. Girlfriend taking stable pt home.

## 2023-02-01 NOTE — ANESTHESIA PRE PROCEDURE
Department of Anesthesiology  Preprocedure Note       Name:  Pat Del Cid   Age:  43 y.o.  :  1980                                          MRN:  [id number]         Date:  2023      Surgeon: Michael Kim):  Moise Díaz DPM    Procedure: Procedure(s):  RIGHT FOOT EXCISION ULCERATION WITH LOCAL ADVANCEMENT TISSUE FLAP FOR CLOSURE  RIGHT FOOT HAMMERTOE FOURTH AND FIFTH METATARSALS WITH FLEXOR TENATOMY  EXCISION OF BONE SPUR RIGHT FOOT    Medications prior to admission:   Prior to Admission medications    Medication Sig Start Date End Date Taking? Authorizing Provider   amLODIPine (NORVASC) 5 MG tablet Take 5 mg by mouth daily 21  Yes Historical Provider, MD   hydroCHLOROthiazide (MICROZIDE) 12.5 MG capsule Take 12.5 mg by mouth daily   Yes Historical Provider, MD       Current medications:    Current Facility-Administered Medications   Medication Dose Route Frequency Provider Last Rate Last Admin    ceFAZolin (ANCEF) 2,000 mg in sodium chloride 0.9 % 50 mL IVPB (mini-bag)  2,000 mg IntraVENous Once [place] Torres V, DPM        lactated ringers IV soln infusion   IntraVENous Continuous [place] Torres V,  mL/hr at 23 0631 New Bag at 23       Allergies:  No Known Allergies    Problem List:  There is no problem list on file for this patient.       Past Medical History:        Diagnosis Date    Hypertension     Prolonged emergence from general anesthesia        Past Surgical History:        Procedure Laterality Date    CHOLECYSTECTOMY      FOOT SURGERY Left 2022    LEFT FOOT HAMMERTOE FOURTH AND FIFTH METATARSAL WITH EXCISION OF BONE SPURRING, LEFT FOOT SKIN PLASTY ADJACENT TISSUE FLAP CLOSURE performed by Moise Díaz DPM at [facility] OR       Social History:    Social History     Tobacco Use    Smoking status: Every Day     Types: Cigars    Smokeless tobacco: Never   Substance Use Topics    Alcohol use: Never                                Ready to quit: Not Answered  Counseling given: Not Answered      Vital Signs (Current):   Vitals:    01/25/23 1358 02/01/23 0612   BP:  (!) 143/94   Pulse:  67   Resp:  20   Temp:  97.8 °F (36.6 °C)   TempSrc:  Temporal   SpO2:  100%   Weight: 230 lb (104.3 kg) 220 lb (99.8 kg)   Height: 6' (1.829 m) 6' (1.829 m)                                              BP Readings from Last 3 Encounters:   02/01/23 (!) 143/94   06/24/22 (!) 137/90   01/11/22 (!) 154/92       NPO Status: Time of last liquid consumption: 0000                        Time of last solid consumption: 0000                        Date of last liquid consumption: 02/01/23                        Date of last solid food consumption: 02/01/23    BMI:   Wt Readings from Last 3 Encounters:   02/01/23 220 lb (99.8 kg)   06/24/22 225 lb (102.1 kg)     Body mass index is 29.84 kg/m². CBC: No results found for: WBC, RBC, HGB, HCT, MCV, RDW, PLT    CMP: No results found for: NA, K, CL, CO2, BUN, CREATININE, GFRAA, AGRATIO, LABGLOM, GLUCOSE, GLU, PROT, CALCIUM, BILITOT, ALKPHOS, AST, ALT    POC Tests: No results for input(s): POCGLU, POCNA, POCK, POCCL, POCBUN, POCHEMO, POCHCT in the last 72 hours. Coags: No results found for: PROTIME, INR, APTT    HCG (If Applicable): No results found for: PREGTESTUR, PREGSERUM, HCG, HCGQUANT     ABGs: No results found for: PHART, PO2ART, NEK9VLU, XYV2SJD, BEART, A8OEAQHQ     Type & Screen (If Applicable):  No results found for: LABABO, LABRH    Drug/Infectious Status (If Applicable):  No results found for: HIV, HEPCAB    COVID-19 Screening (If Applicable): No results found for: COVID19        Anesthesia Evaluation  Patient summary reviewed and Nursing notes reviewed history of anesthetic complications (prolongedemergence):    Airway: Mallampati: II  TM distance: >3 FB   Neck ROM: full  Mouth opening: > = 3 FB   Dental: normal exam         Pulmonary:normal exam  breath sounds clear to auscultation Cardiovascular:  Exercise tolerance: good (>4 METS),   (+) hypertension:,         Rhythm: regular  Rate: normal                    Neuro/Psych:   Negative Neuro/Psych ROS              GI/Hepatic/Renal: Neg GI/Hepatic/Renal ROS            Endo/Other: Negative Endo/Other ROS                    Abdominal:             Vascular: negative vascular ROS. Other Findings:           Anesthesia Plan      general     ASA 2       Induction: intravenous. MIPS: Postoperative opioids intended and Prophylactic antiemetics administered. Anesthetic plan and risks discussed with patient. Plan discussed with CRNA.     Attending anesthesiologist reviewed and agrees with Preprocedure content                Dee Dee Joe MD   2/1/2023

## 2023-02-01 NOTE — PROGRESS NOTES
Dr Jia Cordero    Pre op: Hammertoe 4th and 5th toes of the right foot, Bone spur 4th and 5th toes of the right foot, Ulceration grade 3 right foot, Pain    Post op: same    Procedure: Excision bone spur 4th and 5th toes of the right foot, Excision ulceration with advancement tissue closure skin closure right foot, Flexor tenotomy 4th and 5th toes of the right foot    Anesthesia: General    Complications: None noted    EBL: less than 10 cc

## 2023-02-01 NOTE — PROGRESS NOTES
Pt arrived from OR to PACU bay 6. Reported received from OR staff. Pt arousable to pain.  Pt not following commands-  attempting to pull out NA.  Racepinephrine-epi given upon arrival.  Surgical incisions dressings in place to right foot . Pt on RA-NA removed. NSR, and VSS. Will continue to monitor.

## 2023-02-01 NOTE — ANESTHESIA POSTPROCEDURE EVALUATION
Department of Anesthesiology  Postprocedure Note    Patient: Mimi Chua  MRN: [id number]  YOB: 1980  Date of evaluation: 2/1/2023      Procedure Summary     Date: 02/01/23 Room / Location: [address]    Anesthesia Start: 0730 Anesthesia Stop: 0911    Procedures:       RIGHT FOOT EXCISION ULCERATION WITH LOCAL ADVANCEMENT TISSUE FLAP FOR CLOSURE (Right: Foot)      RIGHT FOOT HAMMERTOE FOURTH AND FIFTH METATARSALS WITH FLEXOR TENATOMY (Right)      EXCISION OF BONE SPUR RIGHT FOOT (Right) Diagnosis:       Osteophyte, unspecified joint      Short Achilles tendon (acquired), right ankle      Laceration with foreign body, right foot, initial encounter      (Osteophyte, unspecified joint [M25.70])      (Short Achilles tendon (acquired), right ankle [M67.01])      (Laceration with foreign body, right foot, initial encounter [S91.321A])    Surgeons: Rodrick BONILLA DPM Responsible Provider: Xuan Vieira MD    Anesthesia Type: general ASA Status: 2          Anesthesia Type: No value filed.    Yves Phase I: Yves Score: 8    Yves Phase II:        Anesthesia Post Evaluation    Patient location during evaluation: PACU  Patient participation: complete - patient participated  Level of consciousness: awake  Airway patency: patent  Nausea & Vomiting: no nausea and no vomiting  Complications: no  Cardiovascular status: blood pressure returned to baseline  Respiratory status: acceptable  Hydration status: stable

## 2023-02-02 NOTE — OP NOTE
[facility]                     [address] [facility], [address] [place] [address]                                OPERATIVE REPORT    PATIENT NAME: Lesley Hess                     :        1980  MED REC NO:   [id number]                          ROOM:  ACCOUNT NO:   [de-identified]                           ADMIT DATE: 2023  PROVIDER:     Ladarius Wayne DPM    DATE OF PROCEDURE:  2023    PREOPERATIVE DIAGNOSES:  1. Hammertoe, fourth and fifth toes of the right foot. 2.  Bone spurs, fourth and fifth toes of the right foot. 3.  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 4.  Pain. 5.  Ulceration, grade 3 with complete destruction of interspace skin  tissue of the right foot. POSTOPERATIVE DIAGNOSES:  1. Hammertoe, fourth and fifth toes of the right foot. 2.  Bone spurs, fourth and fifth toes of the right foot. 3.  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 4.  Pain. 5.  Ulceration, grade 3 with complete destruction of interspace skin  tissue of the right foot. OPERATION PERFORMED:  1. Excision of bone spur, fourth and fifth toes of the right foot. 2.  Excision of ulceration with advancement tissue skin closure of the  right foot. 3.  Enforced flexor tenotomy, fourth and fifth toes of the right foot. SURGEON:  Ladarius Wayne DPM    ANESTHESIA:  General.    COMPLICATIONS:  None noted. ESTIMATED BLOOD LOSS:  Less than 10 mL. PROCEDURE IN DETAIL:  Under mild sedation, the patient was brought into  the operating room and placed on the operating table in the supine  position. A pneumatic ankle cuff was then placed about the patient's  right ankle, 2 cm above the medial and lateral malleoli with excessive  cast padding. The patient was placed under general anesthesia and then,  local anesthesia was obtained about the base of the fourth and fifth  rays, utilizing 10 mL of 2% lidocaine plain.   The foot and lower leg  were then scrubbed, prepped, and draped in the usual aseptic manner. An  Esmarch bandage was then utilized to exsanguinate the patient's right  foot and lower leg. The pneumatic ankle cuff tourniquet was inflated to  250 mmHg, right. Attention was then directed to the dorsal aspect of the fourth and fifth  toes where there was moderate contracture of the toes at the PIP joint. There was full pressure contact between fourth and fifth toes, creating  a very severely degenerative dead skin area with grade 3 open ulceration  on the webspace area. So, at this time, we took a #15 blade and we  started at the distal plantar portion of the fourth and fifth toes. We  made a stab incision under the DIP joint of the fourth toe and the DIP  joint of the fifth toe. We transected the flexor tendon and transected  the capsule, extended this out by stretching it and put the fourth and  fifth toes out of the plantar contracture. The area was then flushed  with copious amounts of sterile normal saline. Skin was then sewn  utilizing 4-0 nylon with a simple interrupted suture technique. Next, attention was then directed to the dorsal aspect of the fourth and  fifth toes where we made an incision directly over the dorsal aspect of  the PIP and MTP joints of both the fourth and fifth toes. Dissection  was then continued all the way down through the subcutaneous tissues  with care being taken to identify and retract all vital neural and  vascular structures. All bleeders were cauterized and ligated as  necessary. Next, We dissected down to the lateral side and we freed up all the  tissue of the periosteum and capsular structures on the lateral aspect  of the fourth PIP and fourth MTP joint and then, the medial aspect of  the fifth PIP and fifth MTP joint. We then made incisions between the  tendon and the periosteal tissues.   We reflected these tissues up and  thus exposed the proximal phalanx in the metatarsal head of the fourth  and fifth toe regions. Next, an oscillating saw was then brought into play. We removed the  bone spur from the lateral aspect of the head of the proximal phalanx  and the fourth metatarsal, utilizing 138 saw blade. This wedge of the  bone was completely removed in toto. Power reciprocating rasp came in  and plained all of the tissue planes down into a very straight rectus  position. Then, we went to the fifth toe and we took the same 138 saw  blade. We removed the medial bone spurring from the proximal phalanx  head as well as the fifth MTP joint area too as well. This bone was  then removed in toto. It was planned down with a power reciprocating  rasp. The area was then flushed with copious amounts of sterile normal  saline. The periosteum and capsular structures were then sewn with 3-0  Vicryl, subcutaneous tissues were sewn with 4-0 Vicryl, and the skin was  then sewn with 3-0 nylon, all with simple interrupted suture technique. Final portion of the procedure was we gently opened the fourth  interspace where there was a complete destruction of the entire  inferior, superior, central, medial, and lateral aspect of the  interspace with complete dead skin tissue with drainage. We took a #15  blade and we made a double semi-elliptical star incision beginning over  the dorsal aspect of the interspace continuing towards the lateral side  of the fourth toe and then back down to the central part beneath the  sub-fifth MTP joint and then, we made an incision from the top of the  interspace down towards the medial side of the fifth MTP and PIP joint  and then all the way down into the fifth MTP joint on the plantar side. It was almost a stellate incision with a level of destruction. This was  removed all the way down to the subcutaneous tissue. This was  completely removed. The subcutaneous tissues were very healthy at this  time.   We flushed it with copious amounts of sterile normal saline. We  did extensive undermining on both the superior, medial, lateral, and  inferior aspects. We then took 3-0 Vicryl and we sewed the subcutaneous  tissue plane together, which brought the fourth and fifth toes within  reasonable approximation and then we took 3-0 nylon and then, we sewed  the skin up with simple interrupted suture technique for complete  closure and mild syndactylization procedure to get all of the dead skin  tissue out of this region. We placed the toes through range of motion  and skin stayed together. Intraoperative C-arm confirmed excellent  position of the toes as well as excellent resection of the bone spurring  previously described. The area was then flushed with copious amounts of  sterile normal saline. This was injected with 10 mL of 0.5% Marcaine  plain and then Betadine-soaked Adaptic, sterile 4x4s, 4x8s, two rolls of  2-inch Diallo, and several layers of fluffy cast padding were placed on  the patient's right foot. Tourniquet had been deflated before the  bandage was placed on to make sure he had full capillary refill time,  which he did. I will see the patient back in the office for bandage change and recheck  on Monday. He is to keep the dressings dry, clean, and intact; elevate  above level of heart; and place ice about this. The patient is to  remain strict nonweightbearing with crutch use. He is to begin using  his antibiotics, pain pills as well as anticoagulant medication,  Xarelto. I will [place] back [place] bandage change and  recheck.         Luis Ley DPM    D: 02/01/2023 9:13:37       T: 02/01/2023 12:44:52     [id number]  Job#: [id number]     Doc#: [id number]    CC:  Silverio Lebron DPM

## (undated) DEVICE — PADDING CAST W4INXL4YD ST COT RAYON MICROPLEATED HIGHLY

## (undated) DEVICE — RASP SURG L W0.27XL0.55IN TEAR CRSS CUT MIC RECIP SAW

## (undated) DEVICE — MASC TURNOVER KIT: Brand: MEDLINE INDUSTRIES, INC.

## (undated) DEVICE — BANDAGE COMPR W4INXL12FT E DISP ESMARCH EVEN

## (undated) DEVICE — MAJOR SET UP PK

## (undated) DEVICE — T-DRAPE,EXTREMITY,STERILE: Brand: MEDLINE

## (undated) DEVICE — HYPODERMIC SAFETY NEEDLE: Brand: MAGELLAN

## (undated) DEVICE — SUTURE VCRL SZ 2-0 L18IN ABSRB UD POLYGLACTIN 910 BRAID TIE J111T

## (undated) DEVICE — SUTURE VCRL + SZ 3-0 L18IN ABSRB UD PS-2 3/8 CIR REV CUT VCP497H

## (undated) DEVICE — GAUZE,SPONGE,4"X4",8PLY,STRL,LF,10/TRAY: Brand: MEDLINE

## (undated) DEVICE — SOLUTION IRRIG 500ML 0.9% SOD CHL USP POUR PLAS BTL

## (undated) DEVICE — INTENDED FOR TISSUE SEPARATION, AND OTHER PROCEDURES THAT REQUIRE A SHARP SURGICAL BLADE TO PUNCTURE OR CUT.: Brand: BARD-PARKER ® STAINLESS STEEL BLADES

## (undated) DEVICE — GOWN,AURORA,NONREINF,RAGLAN,XXL,STERILE: Brand: MEDLINE

## (undated) DEVICE — SYRINGE, LUER LOCK, 10ML: Brand: MEDLINE

## (undated) DEVICE — C-ARM: Brand: UNBRANDED

## (undated) DEVICE — STATION ISOLATN W1775XH205IN D675IN ICU WALL OR GLS MT P2

## (undated) DEVICE — DRESSING PETRO W3XL3IN OIL EMUL N ADH GZ KNIT IMPREG CELOS

## (undated) DEVICE — SUTURE VCRL SZ 4-0 L18IN ABSRB UD L19MM PS-2 3/8 CIR PRIM J496H

## (undated) DEVICE — SHEET,DRAPE,53X77,STERILE: Brand: MEDLINE

## (undated) DEVICE — TOWEL,OR,DSP,ST,BLUE,STD,4/PK,20PK/CS: Brand: MEDLINE

## (undated) DEVICE — 2.0MM ROUND SOLID CARBIDE BUR MEDIUM

## (undated) DEVICE — WET SKIN PREP TRAY: Brand: MEDLINE INDUSTRIES, INC.

## (undated) DEVICE — PRECISION THIN (7.0 X 0.38 X 15.0MM)

## (undated) DEVICE — BANDAGE GZ W2XL75IN ST RAYON POLY CNFRM STRTCH LTWT

## (undated) DEVICE — ZIMMER® STERILE DISPOSABLE TOURNIQUET CUFF WITH PLC, DUAL PORT, SINGLE BLADDER, 18 IN. (46 CM)

## (undated) DEVICE — SUTURE ETHLN SZ 3-0 L18IN NONABSORBABLE BLK PS-2 L19MM 3/8 1669H

## (undated) DEVICE — SOLUTION IRRIG 500ML 0.9% SOD CHLO USP POUR PLAS BTL

## (undated) DEVICE — MEDICINE CUP, GRADUATED, STER: Brand: MEDLINE

## (undated) DEVICE — GLOVE ORANGE PI 8 1/2   MSG9085

## (undated) DEVICE — GOWN,SIRUS,FABRNF,2XL,18/CS: Brand: MEDLINE

## (undated) DEVICE — STOCKINETTE CAST W6XL48IN WHT POLY IMPERV PRECUT SYN DBL

## (undated) DEVICE — GLOVE SURG SZ 9 THK91MIL LTX FREE SYN POLYISOPRENE ANTI

## (undated) DEVICE — ELECTRODE ELECSURG NDL 2.8 INX7.2 CM COAT INSUL EDGE

## (undated) DEVICE — CURITY NON-ADHERENT STRIPS: Brand: CURITY

## (undated) DEVICE — 3M™ COBAN™ NL STERILE NON-LATEX SELF-ADHERENT WRAP, 2084S, 4 IN X 5 YD (10 CM X 4,5 M), 18 ROLLS/CASE: Brand: 3M™ COBAN™

## (undated) DEVICE — SUTURE VCRL SZ 3-0 L18IN ABSRB UD PS-2 L19MM 1/2 CIR J497G

## (undated) DEVICE — ELECTRODE PT RET AD L9FT HI MOIST COND ADH HYDRGEL CORDED

## (undated) DEVICE — PRECISION THIN (5.5 X 0.38 X 18.0MM)